# Patient Record
Sex: MALE | Race: BLACK OR AFRICAN AMERICAN | NOT HISPANIC OR LATINO | Employment: OTHER | URBAN - METROPOLITAN AREA
[De-identification: names, ages, dates, MRNs, and addresses within clinical notes are randomized per-mention and may not be internally consistent; named-entity substitution may affect disease eponyms.]

---

## 2019-10-18 ENCOUNTER — TELEPHONE (OUTPATIENT)
Dept: PEDIATRIC CARDIOLOGY | Facility: CLINIC | Age: 64
End: 2019-10-18

## 2019-10-18 NOTE — TELEPHONE ENCOUNTER
Genetic Testing performed  D/t  Son's diagnosis;   Cardiopulmonary arrest secondary to VT/VF s/p SICD    2. Cardiac arrest    3. Ventricular fibrillation    4. ICD (implantable cardioverter-defibrillator) in place    5. Essential hypertension    6. Mild concentric left ventricular hypertrophy (LVH)    7. Other acute pulmonary embolism without acute cor pulmonale    8. Abnormal genetic test      Phoned Madi and advised him that his genetic testing is similar gene to Robert'. Instructed Madi he needs to f/u with his cardiologist in regards to his genetic testing.  Madi advised he would get me the name and fax number so we can fax over the results for their review.

## 2019-10-22 ENCOUNTER — TELEPHONE (OUTPATIENT)
Dept: PEDIATRIC CARDIOLOGY | Facility: CLINIC | Age: 64
End: 2019-10-22

## 2019-10-22 NOTE — TELEPHONE ENCOUNTER
Spoke with Madi he verified he sees Dr. Lemus and to fax results to [phone].    Lindsay verified with Dr. Lemus that he follow Madi.       Faxed over genetic testing for review. (Romy faxed yesterday see media.)

## 2020-01-21 PROBLEM — E78.5 DYSLIPIDEMIA: Status: ACTIVE | Noted: 2017-12-20

## 2020-01-21 PROBLEM — I48.91 ATRIAL FIBRILLATION WITH RAPID VENTRICULAR RESPONSE: Status: ACTIVE | Noted: 2019-03-04

## 2020-01-21 PROBLEM — E66.9 OBESITY (BMI 30-39.9): Status: ACTIVE | Noted: 2019-03-05

## 2020-01-21 PROBLEM — M17.11 OSTEOARTHRITIS OF RIGHT KNEE: Status: ACTIVE | Noted: 2018-04-17

## 2020-02-07 PROBLEM — I48.0 PAROXYSMAL ATRIAL FIBRILLATION: Status: ACTIVE | Noted: 2019-03-04

## 2020-02-07 PROBLEM — Z12.11 SCREEN FOR COLON CANCER: Status: ACTIVE | Noted: 2017-06-21

## 2020-02-07 PROBLEM — J01.01 ACUTE RECURRENT MAXILLARY SINUSITIS: Status: ACTIVE | Noted: 2017-02-20

## 2020-09-06 PROBLEM — I24.9 ACS (ACUTE CORONARY SYNDROME): Status: ACTIVE | Noted: 2020-09-06

## 2020-09-07 PROBLEM — R55 SYNCOPE: Status: ACTIVE | Noted: 2020-09-07

## 2020-09-08 PROBLEM — R55 SYNCOPE: Status: RESOLVED | Noted: 2020-09-07 | Resolved: 2020-09-08

## 2020-09-08 PROBLEM — I24.9 ACS (ACUTE CORONARY SYNDROME): Status: RESOLVED | Noted: 2020-09-06 | Resolved: 2020-09-08

## 2020-09-22 PROBLEM — R79.89 ELEVATED TROPONIN: Status: ACTIVE | Noted: 2020-09-22

## 2020-09-30 PROBLEM — R79.89 ELEVATED TROPONIN: Status: RESOLVED | Noted: 2020-09-22 | Resolved: 2020-09-30

## 2020-12-16 PROBLEM — R06.02 SOB (SHORTNESS OF BREATH): Status: ACTIVE | Noted: 2020-12-16

## 2021-01-14 PROBLEM — G47.9 FATIGUE DUE TO SLEEP PATTERN DISTURBANCE: Status: ACTIVE | Noted: 2021-01-14

## 2021-01-14 PROBLEM — R53.83 FATIGUE DUE TO SLEEP PATTERN DISTURBANCE: Status: ACTIVE | Noted: 2021-01-14

## 2021-10-20 PROBLEM — R10.9 ABDOMINAL PAIN: Status: ACTIVE | Noted: 2021-10-20

## 2022-08-06 DIAGNOSIS — U07.1 COVID-19 VIRUS DETECTED: ICD-10-CM

## 2023-03-07 PROBLEM — K59.00 CONSTIPATION: Status: ACTIVE | Noted: 2023-03-07

## 2023-04-18 PROBLEM — R26.89 DECREASED MOBILITY: Status: ACTIVE | Noted: 2023-04-18

## 2023-06-23 PROBLEM — H25.811 COMBINED FORMS OF AGE-RELATED CATARACT OF RIGHT EYE: Status: ACTIVE | Noted: 2023-06-23

## 2023-08-02 PROBLEM — R19.8 ABNORMAL FINDINGS ON ESOPHAGOGASTRODUODENOSCOPY (EGD): Status: ACTIVE | Noted: 2023-08-02

## 2023-09-10 PROBLEM — M79.89 LEG SWELLING: Status: ACTIVE | Noted: 2023-09-10

## 2023-09-10 PROBLEM — R00.2 PALPITATIONS: Status: ACTIVE | Noted: 2023-09-10
